# Patient Record
Sex: MALE | Race: WHITE | URBAN - METROPOLITAN AREA
[De-identification: names, ages, dates, MRNs, and addresses within clinical notes are randomized per-mention and may not be internally consistent; named-entity substitution may affect disease eponyms.]

---

## 2018-02-14 ENCOUNTER — TELEPHONE (OUTPATIENT)
Dept: SURGERY | Age: 58
End: 2018-02-14

## 2018-02-14 ENCOUNTER — OFFICE VISIT (OUTPATIENT)
Dept: SURGERY | Age: 58
End: 2018-02-14

## 2018-02-14 VITALS
DIASTOLIC BLOOD PRESSURE: 77 MMHG | RESPIRATION RATE: 18 BRPM | HEART RATE: 61 BPM | OXYGEN SATURATION: 98 % | SYSTOLIC BLOOD PRESSURE: 117 MMHG | HEIGHT: 70 IN | BODY MASS INDEX: 34.5 KG/M2 | TEMPERATURE: 95.4 F | WEIGHT: 241 LBS

## 2018-02-14 DIAGNOSIS — L29.0 PRURITUS ANI: ICD-10-CM

## 2018-02-14 DIAGNOSIS — K64.8 INTERNAL HEMORRHOIDS: Primary | ICD-10-CM

## 2018-02-14 PROBLEM — F51.12 BEHAVIORALLY INDUCED INSUFFICIENT SLEEP SYNDROME: Status: ACTIVE | Noted: 2017-06-15

## 2018-02-14 PROBLEM — E66.9 OBESITY (BMI 30.0-34.9): Status: ACTIVE | Noted: 2017-06-15

## 2018-02-14 RX ORDER — ESCITALOPRAM OXALATE 10 MG/1
10 TABLET ORAL
COMMUNITY

## 2018-02-14 RX ORDER — HYDROCORTISONE 25 MG/G
CREAM TOPICAL
Qty: 30 G | Refills: 2 | Status: SHIPPED | OUTPATIENT
Start: 2018-02-14 | End: 2018-11-05

## 2018-02-14 RX ORDER — UBIDECARENONE 50 MG
CAPSULE ORAL
COMMUNITY

## 2018-02-14 NOTE — PROGRESS NOTES
[facility]  Colon and Rectal Surgery  [address] [address]              Colon and Rectal Surgery Consult          Patient: Haleigh Turner  MRN: [id number]  Date: 2/14/2018     Age:  65 y.o.,      Sex: male    YOB: 1960      Subjective     is an 65 y.o. male referred by Dr. Manuela Acuña. The patient presents with episodes of fecal soilage and perianal itching. With wiping post defecation, he will also see some bright red blood on tissue.  reports symptoms have presented for 2 months. He has not had previous rectal surgery.  denies associated fever. A history of inflammatory bowel disease has not been reported. The patient denies any anorectal pain, change in bowel habits, weight changes, nor any abdominal pain. Patient denies constipation, vomiting, diarrhea, loss of appetite, reflux and nausea. Bowel habits are reported as normal without unsual diarrhea, constipation. The family history is negative for colon cancer/polyps, other GI malignancies, nor inflammatory bowel diseases. The patient underwent a colonoscopy exam last year with unremarkable findings as per the patient. Past Medical History:   Diagnosis Date    Hyperlipidemia     Sleep apnea        Past Surgical History:   Procedure Laterality Date    HX COLONOSCOPY  2017    HX HERNIA REPAIR         No Known Allergies    Prior to Admission medications    Medication Sig Start Date End Date Taking? Authorizing Provider   red yeast rice 600 mg tab Take  by Mouth. Indications: ELEVATED CHOLESTEROL   Yes Historical Provider   escitalopram oxalate (LEXAPRO) 10 mg tablet 10 mg. Yes Historical Provider   CHOLECALCIFEROL, VITAMIN D3,  mg. Yes Historical Provider       Current Outpatient Prescriptions   Medication Sig Dispense Refill    red yeast rice 600 mg tab Take  by Mouth.  Indications: ELEVATED CHOLESTEROL      escitalopram oxalate (LEXAPRO) 10 mg tablet 10 mg.      CHOLECALCIFEROL, VITAMIN D3,  mg. Social History     Social History    Marital status:      Spouse name: N/A    Number of children: N/A    Years of education: N/A     Occupational History    Not on file. Social History Main Topics    Smoking status: Never Smoker    Smokeless tobacco: Never Used    Alcohol use Not on file    Drug use: Not on file    Sexual activity: Not on file     Other Topics Concern    Not on file     Social History Narrative    No narrative on file       Family History   Problem Relation Age of Onset    Diabetes Mother     Heart Disease Father            Review of Systems:    A comprehensive review of systems was negative except for that written in the History of Present Illness. Objective:        Visit Vitals    /77    Pulse 61    Temp 95.4 °F (35.2 °C) (Oral)    Resp 18    Ht 5' 10\" (1.778 m)    Wt 109.3 kg (241 lb)    SpO2 98%    BMI 34.58 kg/m2       Physical Exam:   GENERAL: alert, cooperative, no distress, appears stated age  LUNG: clear to auscultation bilaterally  HEART: regular rate and rhythm  EXTREMITIES:  extremities normal, atraumatic, no cyanosis or edema     Anorectal:  With the patient in the prone position the anus appeared abnormal with findings of mild perianal pruritus ani. Digital rectal examination revealed Normal sphincter tone and squeeze pressure. Palpation revealed No Masses. Anoscopy revealed mild grade 1 internal hemorrhoid disease findings with moderate inflammation. No active bleeding was noted. Assessment / Nato Gonzalez is an 65 y.o. male with mild grade 1 internal hemorrhoid disease with resulting mild perianal pruritus ani and fecal soilage most likely form mucus discharges. The patient was reassures and I recommended starting hemorrhoid management regimen consisting of:    1. Frequent sitz baths at home. 2. High fiber diet.   3. Application of Proctosol HC cream as instructed. The patient will follow up in my clinic in about 2 months. Thank you for allowing me to participate in the patient's care.           Esa Stanley MD, FACS, FASCRS  Colon and Rectal Surgery  [facility]  Office [phone]  Fax     [phone]  2/14/2018  3:58 PM

## 2018-02-14 NOTE — PATIENT INSTRUCTIONS
If you have any questions or concerns about today's appointment, the verbal and/or written instructions you were given for follow up care, please call our office at [phone].     [facility] - [address]    [phone] office  [phone]

## 2018-02-14 NOTE — LETTER
2/14/2018 3:47 PM 
 
Patient:  Jennifer Culp YOB: 1960 Date of Visit: 2/14/2018 Sheryle Drilling., MD 
[address] [place] [address],[facility] Floor [address] [place] [address] VIA Facsimile: [phone] Dear Sheryle Drilling., MD, Thank you for referring Mr. Nara Borges to [place] [address] for evaluation and treatment. Below are the relevant portions of my assessment and plan of care. Thank you very much for your referral of Mr. Nara Borges. If you have questions, please do not hesitate to call me. I look forward to following MrEmy Tang along with you and will keep you updated as to his progress. Sincerely, Dyana Hicks MD

## 2018-04-16 ENCOUNTER — OFFICE VISIT (OUTPATIENT)
Dept: SURGERY | Age: 58
End: 2018-04-16

## 2018-04-16 VITALS
HEIGHT: 70 IN | DIASTOLIC BLOOD PRESSURE: 81 MMHG | TEMPERATURE: 96.1 F | HEART RATE: 62 BPM | WEIGHT: 239 LBS | SYSTOLIC BLOOD PRESSURE: 130 MMHG | RESPIRATION RATE: 20 BRPM | BODY MASS INDEX: 34.22 KG/M2

## 2018-04-16 DIAGNOSIS — K64.8 HEMORRHOIDS WITH COMPLICATION: Primary | ICD-10-CM

## 2018-04-16 NOTE — PROGRESS NOTES
Chapis Roldan is a 63 y.o. male who presents today with No chief complaint on file. 1. Have you been to the ER, urgent care clinic since your last visit? Hospitalized since your last visit? No    2. Have you seen or consulted any other health care providers outside of the [address] [place] [address] since your last visit? Include any pap smears or colon screening.  No

## 2018-04-16 NOTE — MR AVS SNAPSHOT
[facility] 
 
 
 [address] [address] [address] 
[phone] Patient: Kate House MRN: [id number] :1960 Visit Information Date & Time Provider Department Dept. Phone Encounter #  
 2018  3:00 PM MD [facility] [phone] [id number] Upcoming Health Maintenance Date Due Hepatitis C Screening 1960 DTaP/Tdap/Td series (1 - Tdap) 1982 FOBT Q 1 YEAR AGE 50-75 2011 Influenza Age 5 to Adult 2017 Allergies as of 2018  Review Complete On: 2018 By: Jacobo Riojas No Known Allergies Current Immunizations  Never Reviewed No immunizations on file. Not reviewed this visit Vitals BP Pulse Temp Resp Height(growth percentile) Weight(growth percentile) 130/81 (BP 1 Location: Right arm, BP Patient Position: At rest) 62 96.1 °F (35.6 °C) (Oral) 20 5' 10\" (1.778 m) 239 lb (108.4 kg) BMI Smoking Status 34.29 kg/m2 Never Smoker BMI and BSA Data Body Mass Index Body Surface Area  
 34.29 kg/m 2 2.31 m 2 Your Updated Medication List  
  
   
This list is accurate as of 18  3:45 PM.  Always use your most recent med list.  
  
  
  
  
 CHOLECALCIFEROL (VITAMIN D3) PO  
600 mg.  
  
 escitalopram oxalate 10 mg tablet Commonly known as:  Marichuy Li 10 mg.  
  
 hydrocortisone 2.5 % rectal cream  
Commonly known as:  PROCTOSOL HC Insert  into rectum three (3) times daily as needed for Hemorrhoids. red yeast rice 600 mg Tab Take  by Mouth. Indications: ELEVATED CHOLESTEROL Introducing [facility] & HEALTH SERVICES! Indigo Kiran introduces Inspro patient portal. Now you can access parts of your medical record, email your doctor's office, and request medication refills online. 1. In your internet browser, go to [email or web address] 2. Click on the First Time User? Click Here link in the Sign In box. You will see the New Member Sign Up page. 3. Enter your WeVorce Access Code exactly as it appears below. You will not need to use this code after youve completed the sign-up process. If you do not sign up before the expiration date, you must request a new code. · WeVorce Access Code: [email or web address] Expires: 5/15/2018  4:17 PM 
 
4. Enter the last four digits of your Social Security Number (xxxx) and Date of Birth (mm/dd/yyyy) as indicated and click Submit. You will be taken to the next sign-up page. 5. Create a WeVorce ID. This will be your WeVorce login ID and cannot be changed, so think of one that is secure and easy to remember. 6. Create a WeVorce password. You can change your password at any time. 7. Enter your Password Reset Question and Answer. This can be used at a later time if you forget your password. 8. Enter your e-mail address. You will receive e-mail notification when new information is available in [address] 9. Click Sign Up. You can now view and download portions of your medical record. 10. Click the Download Summary menu link to download a portable copy of your medical information. If you have questions, please visit the Frequently Asked Questions section of the WeVorce website. Remember, WeVorce is NOT to be used for urgent needs. For medical emergencies, dial 911. Now available from your iPhone and Android! Please provide this summary of care documentation to your next provider. Your primary care clinician is listed as CHRISTOPHER Thornton. If you have any questions after today's visit, please call [phone].

## 2018-04-16 NOTE — PROGRESS NOTES
[facility]  [address] [place] [address], [address] [place] [address]              Patient: Stephen Erickson  Admitted: (Not on file) MRN: [id number]     Age:  63 y.o.,      Sex: male    YOB: 1960       Madan Juan is an 65 y.o. male who presented to my clinic on 2/14/2018 with episodes of fecal soilage and perianal itching. With wiping post defecation, he will also saw some bright red blood on tissue. Clinical exam showed mild grade 1 internal hemorrhoid disease with resulting mild perianal pruritus ani and fecal soilage most likely form mucus discharges. Therefore I recommended starting hemorrhoid management regimen consisting of frequent sitz baths at home, high fiber diet, and application of Proctosol HC cream as instructed. The patient is definitely feeling better, but not completely. He has no other complaints. Objective    Vitals:    04/16/18 1517   BP: 130/81   Pulse: 62   Resp: 20   Temp: 96.1 °F (35.6 °C)   TempSrc: Oral   Weight: 108.4 kg (239 lb)   Height: 5' 10\" (1.778 m)   PainSc:   0 - No pain       Physical Exam:  General: alert, cooperative, no distress, appears stated age  Anorectal:  With the patient in the prone position the anus appeared within normal limits without any obvious pruritus ani. Digital rectal examination revealed Normal sphincter tone and squeeze pressure. Palpation revealed No Masses. Assessment / Plan    Mr. Chet Kerns is doing better with the aforementioned measures. The patient will continue these measures for now until the symptoms have completely resolved. .    Return in 2 months for follow up.           Mary Fenton MD, FACS, FASCRS  Colon and Rectal Surgery  [facility]  Office [phone]  Fax     [phone]  4/16/2018  3:52 PM

## 2018-06-18 ENCOUNTER — OFFICE VISIT (OUTPATIENT)
Dept: SURGERY | Age: 58
End: 2018-06-18

## 2018-06-18 VITALS
BODY MASS INDEX: 34.36 KG/M2 | HEART RATE: 63 BPM | DIASTOLIC BLOOD PRESSURE: 78 MMHG | RESPIRATION RATE: 20 BRPM | OXYGEN SATURATION: 97 % | SYSTOLIC BLOOD PRESSURE: 125 MMHG | WEIGHT: 240 LBS | HEIGHT: 70 IN

## 2018-06-18 DIAGNOSIS — K64.8 HEMORRHOIDS WITH COMPLICATION: Primary | ICD-10-CM

## 2018-06-18 RX ORDER — HYDROCORTISONE ACETATE 25 MG/1
25 SUPPOSITORY RECTAL 2 TIMES DAILY
Qty: 60 SUPPOSITORY | Refills: 2 | Status: SHIPPED | OUTPATIENT
Start: 2018-06-18

## 2018-06-18 NOTE — PATIENT INSTRUCTIONS
If you have any questions or concerns about today's appointment, the verbal and/or written instructions you were given for follow up care, please call our office at [phone]. [address] [facility] - [address]    [phone] office  [phone]

## 2018-06-18 NOTE — PROGRESS NOTES
1. Have you been to the ER, urgent care clinic since your last visit? Hospitalized since your last visit? No    2. Have you seen or consulted any other health care providers outside of the [address] [place] [address] since your last visit? Include any pap smears or colon screening. No     Patient presents for follow up from medical management of hemorrhoids.

## 2018-06-18 NOTE — PROGRESS NOTES
[employer] [facility]  [facility] [facility] [address], [address], [facility]              Patient: Cori Schaffer  Admitted: (Not on file) MRN: [id number]     Age:  63 y.o.,      Sex: male    YOB: 1960       Madan Calixto is an 63 y.o. male who is here for a follow up visit. He presented to my clinic on 2/14/2018 with episodes of fecal soilage and perianal itching. With wiping post defecation, he will also saw some bright red blood on tissue. Clinical exam showed mild grade 1 internal hemorrhoid disease with resulting mild perianal pruritus ani and fecal soilage most likely form mucus discharges. Therefore I recommended starting hemorrhoid management regimen consisting of frequent sitz baths at home, high fiber diet, and application of Proctosol HC cream as instructed.     The patient appeared to be imrpoving somewhat when I last saw him on 4/16/2018. However, he now states that his symptoms have not improved since. Objective    Vitals:    06/18/18 1516   BP: 125/78   Pulse: 63   Resp: 20   SpO2: 97%   Weight: 108.9 kg (240 lb)   Height: 5' 10\" (1.778 m)   PainSc:   0 - No pain       Physical Exam:  General: alert, cooperative, no distress, appears stated age  Anorectal:  With the patient in the prone position the anus appeared within normal limits. Digital rectal examination revealed Normal sphincter tone and squeeze pressure. Palpation revealed No Masses. Anoscopy revealed still the mild grade 1 internal hemorrhoid disease findings with moderate inflammation. No active bleeding was noted         Assessment / Plan    Mr. Les Winston was instructed on the following new hemorrhoid management regimen consisting of:    1. Frequent sitz baths at home. 2. High fiber diet, with bulk laxatives if needed. 3. Application of Proctosol HC cream as instructed. 4. Addition of Anusol HC suppository therapy. The patient will follow up in my clinic in about 2 months. Dariel Martell MD, FACS, FASCRS  Colon and Rectal Surgery  [facility]  Office [phone]  Fax     [phone]  6/18/2018  4:38 PM

## 2018-06-18 NOTE — MR AVS SNAPSHOT
[facility] 
 
 
 [address] [facility] [address] [place] [address] [address] 
[phone] Patient: Kate House MRN: [id number] :1960 Visit Information Date & Time Provider Department Dept. Phone Encounter #  
 2018  3:00 PM Marcos Bradley MD [facility] [phone] [id number] Your Appointments 2018  3:30 PM  
Follow Up with Marcos Bradley MD  
[address] [place] [address]) Appt Note: 2 month follow up  
 [address] [facility] [address] [place]  
  
   
 [address] [facility] [address] [place] [address] Upcoming Health Maintenance Date Due Hepatitis C Screening 1960 DTaP/Tdap/Td series (1 - Tdap) 1982 FOBT Q 1 YEAR AGE 50-75 2011 Influenza Age 5 to Adult 2018 Allergies as of 2018  Review Complete On: 2018 By: Niya Dobbins LPN No Known Allergies Current Immunizations  Never Reviewed No immunizations on file. Not reviewed this visit You Were Diagnosed With   
  
 Codes Comments Hemorrhoids with complication    -  Primary ICD-10-CM: K64.8 ICD-9-CM: 455.8 Vitals BP Pulse Resp Height(growth percentile) Weight(growth percentile) SpO2  
 125/78 63 20 5' 10\" (1.778 m) 240 lb (108.9 kg) 97% BMI Smoking Status 34.44 kg/m2 Never Smoker Vitals History BMI and BSA Data Body Mass Index Body Surface Area 34.44 kg/m 2 2.32 m 2 Your Updated Medication List  
  
   
This list is accurate as of 18  3:48 PM.  Always use your most recent med list.  
  
  
  
  
 CHOLECALCIFEROL (VITAMIN D3) PO  
600 mg.  
  
 escitalopram oxalate 10 mg tablet Commonly known as:  Marichuy Li 10 mg.  
  
 hydrocortisone 2.5 % rectal cream  
Commonly known as:  PROCTOSOL HC Insert  into rectum three (3) times daily as needed for Hemorrhoids. hydrocortisone 25 mg Supp Commonly known as:  ANUSOL-HC Insert 1 Suppository into rectum two (2) times a day. red yeast rice 600 mg Tab Take  by Mouth. Indications: ELEVATED CHOLESTEROL Prescriptions Printed Refills  
 hydrocortisone (ANUSOL-HC) 25 mg supp 2 Sig: Insert 1 Suppository into rectum two (2) times a day. Class: Print Route: Rectal  
  
We Performed the Following RI ANOSCOPY DX W/COLLJ SPEC BR/WA SPX WHEN PRFRMD C241350 CPT(R)] Patient Instructions If you have any questions or concerns about today's appointment, the verbal and/or written instructions you were given for follow up care, please call our office at [phone]. [facility] - [place] [phone] office [phone] Introducing [facility] & HEALTH SERVICES! Marga Goff introduces Prixing patient portal. Now you can access parts of your medical record, email your doctor's office, and request medication refills online. 1. In your internet browser, go to [email or web address] 2. Click on the First Time User? Click Here link in the Sign In box. You will see the New Member Sign Up page. 3. Enter your Prixing Access Code exactly as it appears below. You will not need to use this code after youve completed the sign-up process. If you do not sign up before the expiration date, you must request a new code. · Prixing Access Code: [email or web address] Expires: 9/16/2018  3:48 PM 
 
4. Enter the last four digits of your Social Security Number (xxxx) and Date of Birth (mm/dd/yyyy) as indicated and click Submit. You will be taken to the next sign-up page. 5. Create a PlaceSpeakt ID. This will be your Prixing login ID and cannot be changed, so think of one that is secure and easy to remember. 6. Create a PlaceSpeakt password. You can change your password at any time. 7. Enter your Password Reset Question and Answer. This can be used at a later time if you forget your password. 8. Enter your e-mail address. You will receive e-mail notification when new information is available in [address] 9. Click Sign Up. You can now view and download portions of your medical record. 10. Click the Download Summary menu link to download a portable copy of your medical information. If you have questions, please visit the Frequently Asked Questions section of the OnShift website. Remember, OnShift is NOT to be used for urgent needs. For medical emergencies, dial 911. Now available from your iPhone and Android! Please provide this summary of care documentation to your next provider. Your primary care clinician is listed as CHRISTOPHER Dudley. If you have any questions after today's visit, please call [phone].

## 2018-08-20 ENCOUNTER — OFFICE VISIT (OUTPATIENT)
Dept: SURGERY | Age: 58
End: 2018-08-20

## 2018-08-20 ENCOUNTER — DOCUMENTATION ONLY (OUTPATIENT)
Dept: SURGERY | Age: 58
End: 2018-08-20

## 2018-08-20 VITALS
SYSTOLIC BLOOD PRESSURE: 128 MMHG | HEIGHT: 70 IN | OXYGEN SATURATION: 98 % | WEIGHT: 235 LBS | BODY MASS INDEX: 33.64 KG/M2 | DIASTOLIC BLOOD PRESSURE: 85 MMHG | HEART RATE: 70 BPM | TEMPERATURE: 96.7 F | RESPIRATION RATE: 16 BRPM

## 2018-08-20 DIAGNOSIS — K64.8 HEMORRHOIDS WITH COMPLICATION: Primary | ICD-10-CM

## 2018-08-20 NOTE — PROGRESS NOTES
1. Have you been to the ER, urgent care clinic since your last visit? Hospitalized since your last visit? No    2. Have you seen or consulted any other health care providers outside of the [address] [place] [address] since your last visit? Include any pap smears or colon screening. No     Patient presents for follow up from medical management of hemorrhoids.

## 2018-08-20 NOTE — PROGRESS NOTES
[employer] [facility]  [address] [place] [address], [address] [place] [address]              Patient: Onesimo Montes  Admitted: (Not on file) MRN: [id number]     Age:  63 y.o.,      Sex: male    YOB: 1960       Madan White is an 63 y.o. male who is here for a follow up visit. He initially presented to my clinic on 2/14/2018 with episodes of fecal soilage and perianal itching. With wiping post defecation, he will also saw some bright red blood on tissue. Clinical exam showed mild grade 1 internal hemorrhoid disease with resulting mild perianal pruritus ani and fecal soilage most likely form mucus discharges.   Therefore I recommended starting hemorrhoid management regimen consisting of frequent sitz baths at home, high fiber diet, and application of Proctosol HC cream as instructed.        The patient steadily improved, but now he has noticed a progressive palpable mass in the anus. He began noticing this several weeks ago. He is experiencing some pain but mostly persistent discomfort.  He denies any bleeding. He is concerned about this and frustrated that he had been doing very well prior to this. Objective    Vitals:    08/20/18 1536   BP: 128/85   Pulse: 70   Resp: 16   Temp: 96.7 °F (35.9 °C)   TempSrc: Oral   SpO2: 98%   Weight: 106.6 kg (235 lb)   Height: 5' 10\" (1.778 m)   PainSc:   0 - No pain       Physical Exam:  General: alert, cooperative, no distress, appears stated age  Lungs: clear to auscultation  Heart: regular rate and rhythm   Anorectal:  With the patient in the prone position the anus appeared abnormal with findings of a prolapsed inflamed externa and internal hemorrhoid disease in the left lateral quadrant. This was a new finding. Digital rectal examination revealed increased sphincter tone and squeeze pressure. Palpation revealed No Masses.     Anoscopy revealed inflamed and adenomatous internal and external hemorrhoid disease findings in the left lateral quadrnat. There were maily grade 1 internal hemorrhoids in the other quadrants. Assessment / Plan    Mr. Vahe Jurado now presents with a prolapsed external and internal hemorrhoid disease. The patient is interested in pursuing surgical therapy, since the medical management have not been entire successful. I discussed the details of the procedure as well as the risks of surgery including bleeding, infection, pain, anesthesia complications, possibility of recurrent disease, and the possibility of anal incontinence with any anal surgery. The patient is willing to accept the risks and would like to proceed with the surgery. The patient would like to discuss this further with his wife and his employer prior to scheduling surgery.           Patricia Ruiz MD, FACS, FASCRS  Colon and Rectal Surgery  [facility]  Office [phone]  Fax     [phone]  8/20/2018  4:12 PM

## 2018-08-20 NOTE — PROGRESS NOTES
No surgery was scheduled yet. Mr. Vahe Jurado said he will talk to his wife first and will call me once he decided to proceed with the surgery.

## 2018-08-20 NOTE — PATIENT INSTRUCTIONS
If you have any questions or concerns about today's appointment, the verbal and/or written instructions you were given for follow up care, please call our office at [phone].     [facility] - [place]  [address] [place] [address], [facility] [place] [address]    [phone] office  [phone]

## 2018-11-05 ENCOUNTER — OFFICE VISIT (OUTPATIENT)
Dept: SURGERY | Age: 58
End: 2018-11-05

## 2018-11-05 VITALS
WEIGHT: 232 LBS | BODY MASS INDEX: 33.21 KG/M2 | HEART RATE: 69 BPM | TEMPERATURE: 96.4 F | HEIGHT: 70 IN | OXYGEN SATURATION: 97 % | SYSTOLIC BLOOD PRESSURE: 125 MMHG | RESPIRATION RATE: 16 BRPM | DIASTOLIC BLOOD PRESSURE: 68 MMHG

## 2018-11-05 DIAGNOSIS — K64.8 INTERNAL AND EXTERNAL PROLAPSED HEMORRHOIDS: Primary | ICD-10-CM

## 2018-11-05 DIAGNOSIS — L29.0 PRURITUS ANI: ICD-10-CM

## 2018-11-05 DIAGNOSIS — K64.8 INTERNAL HEMORRHOIDS: ICD-10-CM

## 2018-11-05 RX ORDER — HYDROCORTISONE 25 MG/G
CREAM TOPICAL
Qty: 30 G | Refills: 2 | Status: SHIPPED | OUTPATIENT
Start: 2018-11-05

## 2018-11-05 NOTE — PATIENT INSTRUCTIONS
If you have any questions or concerns about today's appointment, the verbal and/or written instructions you were given for follow up care, please call our office at [phone].     [facility] - [address]    [phone] office  [phone]

## 2018-11-05 NOTE — PROGRESS NOTES
1. Have you been to the ER, urgent care clinic since your last visit? Hospitalized since your last visit? No    2. Have you seen or consulted any other health care providers outside of the [address] [place] [address] since your last visit? Include any pap smears or colon screening. No     Patient presents to discuss surgical options for hemorrhoids.

## 2018-11-05 NOTE — PROGRESS NOTES
[facility]  [facility] [address] [place] [address], [facility]              Patient: Rubi Hamilton  Admitted: (Not on file) MRN: [id number]     Age:  63 y.o.,      Sex: male    YOB: 1960       Madan Barcenas is an 63 y.o. male who is here for a follow up visit.  initially presented to my clinic on 2/14/2018 with episodes of fecal soilage and perianal itching.  With wiping post defecation, he will also saw some bright red blood on tissue. Clinical exam showed mild grade 1 internal hemorrhoid disease with resulting mild perianal pruritus ani and fecal soilage most likely form mucus discharges. I recommended starting hemorrhoid management regimen consisting of frequent sitz baths at home, high fiber diet, and application of Proctosol HC cream as instructed, but this did not improve his symptoms. Furthermore his disease progressed with him presenting with a prolapsed external and internal hemorrhoid disease during his last visit. He feels that this has not improved also. Given his symptoms of persistent pain and mucus discharges despite the maximal medical measures, he wishes to proceed with surgical management. Objective    Vitals:    11/05/18 1405   BP: 125/68   Pulse: 69   Resp: 16   Temp: 96.4 °F (35.8 °C)   TempSrc: Oral   SpO2: 97%   Weight: 105.2 kg (232 lb)   Height: 5' 10\" (1.778 m)   PainSc:   0 - No pain       Physical Exam:  General: alert, cooperative, no distress, appears stated age  Anorectal:  With the patient in the prone position the anus appeared abnormal with findings of a prolapsed inflamed externa and internal hemorrhoid disease in the left lateral quadrant, perhaps a bit smaller than previously. Digital rectal examination revealed Normal sphincter tone and squeeze pressure. Palpation revealed No Masses.     Anoscopy revealed findings of persistent grade 1-2 internal hemorrhoid disease in the right quadrants in addition to the left lateral quadrant prolapsed disease. No active bleeding was noted. Assessment / Plan    Mr. Teofilo Juarez continues to have persistent hemorrhoid disease refractory to the medical measures. I discussed further treatment options with the patient carefully. Given the chronicity of the disease, he elected now surgical management. The patient states that he would like to proceed with surgery after his trip to [facility] in 6 weeks.           Serge Nice MD, FACS, FASCRS  Colon and Rectal Surgery  [facility]  Office [phone]  Fax     [phone]  11/5/2018  3:07 PM

## 2018-12-18 ENCOUNTER — TELEPHONE (OUTPATIENT)
Dept: SURGERY | Age: 58
End: 2018-12-18

## 2018-12-18 NOTE — TELEPHONE ENCOUNTER
Left voice mail message to schedule follow up appt with Dr. Duncan Shaw in Feb 2019 per aria in chart.